# Patient Record
Sex: FEMALE
[De-identification: names, ages, dates, MRNs, and addresses within clinical notes are randomized per-mention and may not be internally consistent; named-entity substitution may affect disease eponyms.]

---

## 2017-04-15 ENCOUNTER — HOSPITAL ENCOUNTER (OUTPATIENT)
Dept: HOSPITAL 14 - H.ER | Age: 32
Setting detail: OBSERVATION
Discharge: HOME | End: 2017-04-15
Attending: EMERGENCY MEDICINE | Admitting: EMERGENCY MEDICINE
Payer: MEDICAID

## 2017-04-15 VITALS — SYSTOLIC BLOOD PRESSURE: 124 MMHG | DIASTOLIC BLOOD PRESSURE: 84 MMHG | RESPIRATION RATE: 16 BRPM | TEMPERATURE: 97.7 F

## 2017-04-15 VITALS — OXYGEN SATURATION: 100 % | HEART RATE: 71 BPM

## 2017-04-15 VITALS — BODY MASS INDEX: 24 KG/M2
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
## 2017-04-15 DIAGNOSIS — S09.90XA: ICD-10-CM

## 2017-04-15 DIAGNOSIS — S00.83XA: Primary | ICD-10-CM

## 2017-04-15 DIAGNOSIS — S63.301A: ICD-10-CM

## 2017-04-15 NOTE — CT
PROCEDURE:  CT HEAD WITHOUT CONTRAST.



HISTORY:

trauma



COMPARISON:

None available. 



TECHNIQUE:

Axial computed tomography images were obtained through the head/brain 

without intravenous contrast.  



Coronal and sagittal reconstructed images.



Radiation dose:



Total exam DLP = 860.42 mGy-cm.



This CT exam was performed using one or more of the following dose 

reduction techniques: Automated exposure control, adjustment of the 

mA and/or kV according to patient size, and/or use of iterative 

reconstruction technique.



FINDINGS:



HEMORRHAGE:

No intracranial hemorrhage. 



BRAIN:

No mass effect or edema.  No atrophy or chronic microvascular 

ischemic changes.



VENTRICLES:

Unremarkable. No hydrocephalus. 



CALVARIUM:

Unremarkable.



PARANASAL SINUSES:

Unremarkable as visualized. No significant inflammatory changes.



MASTOID AIR CELLS:

Unremarkable as visualized. No inflammatory changes.



OTHER FINDINGS:

None.



IMPRESSION:

No acute intracranial abnormalities. No significant findings to 

account for the clinical presentation.

## 2017-04-15 NOTE — RAD
PROCEDURE:  Right Wrist Radiographs.







HISTORY:

trauma



COMPARISON:

None.



FINDINGS:



BONES:

No acute fracture.  Evidence of old ulnar styloid fracture, healed.



JOINTS:

Normal. No dislocation. 



SOFT TISSUES:

Soft tissue swelling about the carpal bones. 



OTHER FINDINGS:

None.



IMPRESSION:

Soft tissue swelling without acute articular or osseous abnormality.
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
## 2017-04-15 NOTE — CT
PROCEDURE:  CT MAXILLOFACIAL BONES WITHOUT CONTRAST



HISTORY:

trauma



COMPARISON:

None



TECHNIQUE:

Contiguous axial CT  images of the maxillofacial bones were obtained. 

Coronal and sagittal reformats were generated.



Radiation dose:



Total exam DLP =  mGy-cm.



This CT exam was performed using one or more of the following dose 

reduction techniques: Automated exposure control, adjustment of the 

mA and/or kV according to patient size, and/or use of iterative 

reconstruction technique.



FINDINGS:



NASAL BONES:

Unremarkable.



ORBITS:

Unremarkable.



PARANASAL SINUSES/ MASTOIDS:

Mild, chronic ethmoid air cell disease.



MAXILLA:

Unremarkable.



MANDIBLE/ TEMPOROMANDIBULAR JOINTS:

Unremarkable.



SKULL BASE:

Unremarkable.



TEMPORAL BONES:

Middle ears and mastoid grossly unremarkable.



OTHER FINDINGS:

None.



IMPRESSION:

Unremarkable non contrast enhanced CT of the maxillofacial bones.

## 2018-08-12 ENCOUNTER — HOSPITAL ENCOUNTER (EMERGENCY)
Dept: HOSPITAL 42 - ED | Age: 33
Discharge: HOME | End: 2018-08-12
Payer: MEDICAID

## 2018-08-12 VITALS
DIASTOLIC BLOOD PRESSURE: 72 MMHG | OXYGEN SATURATION: 100 % | SYSTOLIC BLOOD PRESSURE: 115 MMHG | RESPIRATION RATE: 16 BRPM | HEART RATE: 72 BPM

## 2018-08-12 VITALS — TEMPERATURE: 98.2 F

## 2018-08-12 VITALS — BODY MASS INDEX: 24 KG/M2

## 2018-08-12 DIAGNOSIS — F32.9: Primary | ICD-10-CM

## 2018-08-12 LAB
ALBUMIN SERPL-MCNC: 4.4 G/DL (ref 3–4.8)
ALBUMIN/GLOB SERPL: 1.5 {RATIO} (ref 1.1–1.8)
ALT SERPL-CCNC: 25 U/L (ref 7–56)
APPEARANCE UR: CLEAR
AST SERPL-CCNC: 20 U/L (ref 14–36)
BASOPHILS # BLD AUTO: 0.03 K/MM3 (ref 0–2)
BASOPHILS NFR BLD: 0.3 % (ref 0–3)
BILIRUB UR-MCNC: NEGATIVE MG/DL
BUN SERPL-MCNC: 10 MG/DL (ref 7–21)
CALCIUM SERPL-MCNC: 9.1 MG/DL (ref 8.4–10.5)
COLOR UR: YELLOW
EOSINOPHIL # BLD: 0.3 10*3/UL (ref 0–0.7)
EOSINOPHIL NFR BLD: 2.5 % (ref 1.5–5)
ERYTHROCYTE [DISTWIDTH] IN BLOOD BY AUTOMATED COUNT: 12.1 % (ref 11.5–14.5)
GFR NON-AFRICAN AMERICAN: > 60
GLUCOSE UR STRIP-MCNC: NEGATIVE MG/DL
GRANULOCYTES # BLD: 6.79 10*3/UL (ref 1.4–6.5)
GRANULOCYTES NFR BLD: 65.4 % (ref 50–68)
HGB BLD-MCNC: 14.1 G/DL (ref 12–16)
LEUKOCYTE ESTERASE UR-ACNC: NEGATIVE LEU/UL
LYMPHOCYTES # BLD: 2.6 10*3/UL (ref 1.2–3.4)
LYMPHOCYTES NFR BLD AUTO: 25 % (ref 22–35)
MCH RBC QN AUTO: 31.7 PG (ref 25–35)
MCHC RBC AUTO-ENTMCNC: 35.2 G/DL (ref 31–37)
MCV RBC AUTO: 90.1 FL (ref 80–105)
MONOCYTES # BLD AUTO: 0.7 10*3/UL (ref 0.1–0.6)
MONOCYTES NFR BLD: 6.8 % (ref 1–6)
PH UR STRIP: 6 [PH] (ref 4.7–8)
PLATELET # BLD: 319 10^3/UL (ref 120–450)
PMV BLD AUTO: 10.9 FL (ref 7–11)
PROT UR STRIP-MCNC: NEGATIVE MG/DL
RBC # BLD AUTO: 4.45 10^6/UL (ref 3.5–6.1)
RBC # UR STRIP: NEGATIVE /UL
SP GR UR STRIP: <= 1.005 (ref 1–1.03)
UROBILINOGEN UR STRIP-ACNC: 0.2 E.U./DL
WBC # BLD AUTO: 10.4 10^3/UL (ref 4.5–11)

## 2018-08-12 NOTE — ED PDOC
Physical Exam


Vital Signs Reviewed: Yes


Temperature: Afebrile


Blood Pressure: Normal


Pulse: Regular


Respiratory Rate: Normal


Appearance: Positive for: Well-Appearing, Non-Toxic, Comfortable


Pain Distress: None


Mental Status: Positive for: Alert and Oriented X 3





- Systems Exam


Head: Present: Atraumatic, Normocephalic


Pupils: Present: PERRL


Extroacular Muscles: Present: EOMI


Conjunctiva: Present: Normal


Neck: Present: Normal Range of Motion


Respiratory/Chest: Present: Clear to Auscultation, Good Air Exchange.  No: 

Respiratory Distress, Accessory Muscle Use


Cardiovascular: Present: Regular Rate and Rhythm, Normal S1, S2.  No: Murmurs


Abdomen: No: Tenderness, Distention, Peritoneal Signs


Back: Present: Normal Inspection


Upper Extremity: Present: Normal Inspection.  No: Cyanosis, Edema


Lower Extremity: Present: Normal Inspection.  No: Edema


Neurological: Present: GCS=15, CN II-XII Intact, Speech Normal


Skin: Present: Warm, Dry, Normal Color.  No: Rashes


Psychiatric: Present: Alert, Oriented x 3, Normal Insight, Normal Concentration





<Joesph Tracy - Last Filed: 08/12/18 17:56>





Vital Signs











  Temp Pulse Resp BP Pulse Ox


 


 08/12/18 19:00  98.2 F  88  18  122/78  98


 


 08/12/18 13:00   88  18  102/79  99


 


 08/12/18 11:00  98 F  72  18  132/79  99


 


 08/12/18 09:00   72  18  102/77  99


 


 08/12/18 07:00  98.6 F  72  18  120/77  98


 


 08/12/18 05:09   78  16  110/62  100


 


 08/12/18 01:50  98.7 F  80  17  108/70  100














Medical Decision Making





<Joesph Tracy - Last Filed: 08/12/18 17:56>





<Yariel Esparza - Last Filed: 08/12/18 20:22>


ED Course and Treatment: 





08/12/18 07:10


Case endorsed to me by Dr. Tracy for pending [facility] screening, re-evaluation, 

and disposition. Patient is a 32 year old female who presented to the Emergency 

department earlier today for evaluation of depressed mood. Patient is currently 

resting in bed in no acute distress. Patient presents no new complaints and 

denies suicidal or homicidal ideation. 





08/12/18 17:56


pt pending screener arrival. observed eating, and sitting in nad. (Joesph Tracy)








08/12/18 20:21


Patient was evaluated by [facility] screener.Patient was cleared for discharge.Seen 

as well by GLENDA Ramirez.Pt. for discharge out patient follow up  [facility] outpatient 

services. (Yariel Esparza)





- Lab Interpretations


Lab Results: 











 08/12/18 02:02 





 08/12/18 02:02 





 Lab Results





08/12/18 03:07: Urine Color Yellow, Urine Appearance Clear, Urine pH 6.0, Ur 

Specific Gravity <= 1.005, Urine Protein Negative, Urine Glucose (UA) Negative, 

Urine Ketones Negative, Urine Blood Negative, Urine Nitrate Negative, Urine 

Bilirubin Negative, Urine Urobilinogen 0.2, Ur Leukocyte Esterase Negative


08/12/18 03:00: Urine Opiates Screen Negative, Urine Methadone Screen Negative, 

Ur Barbiturates Screen Negative, Ur Phencyclidine Scrn Negative, Ur 

Amphetamines Screen Negative, U Benzodiazepines Scrn Positive H, U Oth Cocaine 

Metabols Negative, U Cannabinoids Screen Positive H


08/12/18 02:02: Alcohol, Quantitative 10


08/12/18 02:02: Sodium 142, Potassium 3.8, Chloride 105, Carbon Dioxide 24, 

Anion Gap 18, BUN 10, Creatinine 0.6 L, Est GFR ( Amer) > 60, Est GFR (

Non-Af Amer) > 60, Random Glucose 91, Calcium 9.1, Total Bilirubin 0.5, AST 20, 

ALT 25, Alkaline Phosphatase 68, Total Protein 7.4, Albumin 4.4, Globulin 3.0, 

Albumin/Globulin Ratio 1.5


08/12/18 02:02: WBC 10.4, RBC 4.45, Hgb 14.1, Hct 40.1, MCV 90.1, MCH 31.7, 

MCHC 35.2, RDW 12.1, Plt Count 319, MPV 10.9, Gran % 65.4, Lymph % (Auto) 25.0, 

Mono % (Auto) 6.8 H, Eos % (Auto) 2.5, Baso % (Auto) 0.3, Gran # 6.79 H, Lymph 

# (Auto) 2.6, Mono # (Auto) 0.7 H, Eos # (Auto) 0.3, Baso # (Auto) 0.03











- RAD Interpretation


Radiology Orders: 











08/12/18 05:41


CHEST PORTABLE [RAD] Stat 














- Medication Orders


Current Medication Orders: 














Discontinued Medications





Nicotine (Nicoderm Cq)  1 patch TD STAT STA


   Stop: 08/12/18 10:04


   Last Admin: 08/12/18 10:45  Dose: 1 patch





MAR Transdermal Patch Site


 Document     08/12/18 10:45  EWO  (Rec: 08/12/18 10:48  EWO  [id number])


     Transdermal Patch Site


      Transdermal Patch Site                     Right Shoulder














- Scribe Statement


The provider has reviewed the documentation as recorded by the Scribe





<Joesph Tracy - Last Filed: 08/12/18 17:56>





<Yariel Esparza - Last Filed: 08/12/18 20:22>





- Scribe Statement


Aly Hall.





All medical record entries made by the Scribe were at my direction and 

personally dictated by me. I have reviewed the chart and agree that the record 

accurately reflects my personal performance of the history, physical exam, 

medical decision making, and the department course for this patient. I have 

also personally directed, reviewed, and agree with the discharge instructions 

and disposition. (Joesph Tracy)





Disposition/Present on Arrival





- Present on Arrival


Any Indicators Present on Arrival: No


History of DVT/PE: No


History of Uncontrolled Diabetes: No


Urinary Catheter: No


History of Decub. Ulcer: No


History Surgical Site Infection Following: None





- Disposition


Have Diagnosis and Disposition been Completed?: Yes


Disposition Time: 01:00





<Joesph Tracy - Last Filed: 08/12/18 17:56>





- Disposition


Disposition Time: 20:22


Patient Plan: Discharge





<Yariel Esparza - Last Filed: 08/12/18 20:22>





- Disposition


Diagnosis: 


 Depression





Disposition: HOSPITALIZED


Patient Problems: 


 Current Active Problems











Problem Status Onset


 


Depression Acute  











Condition: STABLE


Forms:  [employer] (English)

## 2018-08-12 NOTE — CARD
--------------- APPROVED REPORT --------------





Date of service: 08/12/2018



EKG Measurement

Heart [id number]

MI [id number]

[id number]

[id number]

[id number]



<Conclusion>

Normal sinus rhythm

Normal ECG
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
## 2018-08-12 NOTE — RAD
Date of service: 



08/12/2018



HISTORY:

Screen for possible psych admission  



COMPARISON:

No prior. 



FINDINGS:



LUNGS:

No active pulmonary disease.



PLEURA:

No significant pleural effusion identified, no pneumothorax apparent.



CARDIOVASCULAR:

Normal.



OSSEOUS STRUCTURES:

No significant abnormalities.



VISUALIZED UPPER ABDOMEN:

Normal.



OTHER FINDINGS:

None.



IMPRESSION:

No active disease.